# Patient Record
Sex: FEMALE | URBAN - METROPOLITAN AREA
[De-identification: names, ages, dates, MRNs, and addresses within clinical notes are randomized per-mention and may not be internally consistent; named-entity substitution may affect disease eponyms.]

---

## 2023-04-19 ENCOUNTER — TELEPHONE ENCOUNTER (OUTPATIENT)
Dept: URBAN - METROPOLITAN AREA CLINIC 113 | Facility: CLINIC | Age: 68
End: 2023-04-19

## 2023-05-09 ENCOUNTER — TELEPHONE ENCOUNTER (OUTPATIENT)
Dept: URBAN - METROPOLITAN AREA CLINIC 113 | Facility: CLINIC | Age: 68
End: 2023-05-09

## 2023-05-18 ENCOUNTER — OFFICE VISIT (OUTPATIENT)
Dept: URBAN - METROPOLITAN AREA SURGERY CENTER 25 | Facility: SURGERY CENTER | Age: 68
End: 2023-05-18

## 2023-06-01 ENCOUNTER — OFFICE VISIT (OUTPATIENT)
Dept: URBAN - METROPOLITAN AREA SURGERY CENTER 25 | Facility: SURGERY CENTER | Age: 68
End: 2023-06-01

## 2025-03-05 ENCOUNTER — DASHBOARD ENCOUNTERS (OUTPATIENT)
Age: 70
End: 2025-03-05

## 2025-03-05 ENCOUNTER — OFFICE VISIT (OUTPATIENT)
Dept: URBAN - METROPOLITAN AREA CLINIC 113 | Facility: CLINIC | Age: 70
End: 2025-03-05
Payer: MEDICARE

## 2025-03-05 VITALS
HEART RATE: 72 BPM | BODY MASS INDEX: 25.74 KG/M2 | HEIGHT: 64 IN | TEMPERATURE: 97.5 F | DIASTOLIC BLOOD PRESSURE: 69 MMHG | SYSTOLIC BLOOD PRESSURE: 159 MMHG | RESPIRATION RATE: 18 BRPM | WEIGHT: 150.8 LBS

## 2025-03-05 DIAGNOSIS — R19.7 INTERMITTENT DIARRHEA: ICD-10-CM

## 2025-03-05 DIAGNOSIS — D50.0 IRON DEFICIENCY ANEMIA DUE TO CHRONIC BLOOD LOSS: ICD-10-CM

## 2025-03-05 PROBLEM — 724556004: Status: ACTIVE | Noted: 2025-03-05

## 2025-03-05 PROCEDURE — 99203 OFFICE O/P NEW LOW 30 MIN: CPT

## 2025-03-05 PROCEDURE — 99243 OFF/OP CNSLTJ NEW/EST LOW 30: CPT

## 2025-03-05 RX ORDER — HYDROCHLOROTHIAZIDE 25 MG/1
TAKE 1 TABLET BY MOUTH EVERY DAY IN THE MORNING FOR 90 DAYS TABLET ORAL
Qty: 90 EACH | Refills: 2 | Status: ACTIVE | COMMUNITY

## 2025-03-05 RX ORDER — LEVOTHYROXINE SODIUM 88 UG/1
TABLET ORAL
Qty: 90 TABLET | Status: ACTIVE | COMMUNITY

## 2025-03-05 RX ORDER — LOSARTAN POTASSIUM 100 MG/1
TABLET, FILM COATED ORAL
Qty: 90 TABLET | Status: ACTIVE | COMMUNITY

## 2025-03-05 NOTE — HPI-TODAY'S VISIT:
69-year-old female with a history of hyperlipidemia, prediabetes, hypertension, hypothyroidism is referred by Dr. Alonso for iron deficiency anemia.  A copy of today's visit will be forwarded to the referring provider. Lungs 2/6/2025:Hemoglobin 10.9, hematocrit 35.1, MCV 95.1, platelets 338, HbA1c 6.3, serum iron 53, iron saturation 17.1%, ferritin 55.9, normal LFTs, triglycerides 248, cholesterol 330, , GFR 44.68, normal B12, normal folate, TSH 5.028  She has a long history of low iron but only recently diagnosed with anemia. She has not noticed BRBPR or melena. She does take iron and this causes dark stool. She takes oral iron tablets every other day. She has history of hemorrhoidectomy. She has had issues with passing gas and not meaning to. No family history of anemia. She donated blood for 30-40 years off and on. Her last time donating was 5 years. Denies abdominal pain,  nausea or vomiting. There is no family history of colon cancer. She had a colonoscopy about ten years ago. She had a negative Cologuard about a year ago. She has never had an EGD.

## 2025-03-07 ENCOUNTER — CLAIMS CREATED FROM THE CLAIM WINDOW (OUTPATIENT)
Dept: URBAN - METROPOLITAN AREA SURGERY CENTER 25 | Facility: SURGERY CENTER | Age: 70
End: 2025-03-07
Payer: MEDICARE

## 2025-03-07 DIAGNOSIS — D50.0 IRON DEFICIENCY ANEMIA SECONDARY TO BLOOD LOSS (CHRONIC): ICD-10-CM

## 2025-03-07 PROCEDURE — 43235 EGD DIAGNOSTIC BRUSH WASH: CPT | Performed by: INTERNAL MEDICINE

## 2025-03-07 PROCEDURE — 43235 EGD DIAGNOSTIC BRUSH WASH: CPT | Performed by: CLINIC/CENTER

## 2025-03-07 RX ORDER — HYDROCHLOROTHIAZIDE 25 MG/1
TAKE 1 TABLET BY MOUTH EVERY DAY IN THE MORNING FOR 90 DAYS TABLET ORAL
Qty: 90 EACH | Refills: 2 | Status: ACTIVE | COMMUNITY

## 2025-03-07 RX ORDER — LEVOTHYROXINE SODIUM 88 UG/1
TABLET ORAL
Qty: 90 TABLET | Status: ACTIVE | COMMUNITY

## 2025-03-07 RX ORDER — LOSARTAN POTASSIUM 100 MG/1
TABLET, FILM COATED ORAL
Qty: 90 TABLET | Status: ACTIVE | COMMUNITY

## 2025-04-01 ENCOUNTER — OFFICE VISIT (OUTPATIENT)
Dept: URBAN - METROPOLITAN AREA CLINIC 113 | Facility: CLINIC | Age: 70
End: 2025-04-01
Payer: MEDICARE

## 2025-04-01 DIAGNOSIS — R19.7 INTERMITTENT DIARRHEA: ICD-10-CM

## 2025-04-01 DIAGNOSIS — D50.0 IRON DEFICIENCY ANEMIA DUE TO CHRONIC BLOOD LOSS: ICD-10-CM

## 2025-04-01 PROCEDURE — 99213 OFFICE O/P EST LOW 20 MIN: CPT

## 2025-04-01 RX ORDER — LOSARTAN POTASSIUM 100 MG/1
TABLET, FILM COATED ORAL
Qty: 90 TABLET | Status: ACTIVE | COMMUNITY

## 2025-04-01 RX ORDER — LEVOTHYROXINE SODIUM 88 UG/1
TABLET ORAL
Qty: 90 TABLET | Status: ACTIVE | COMMUNITY

## 2025-04-01 RX ORDER — HYDROCHLOROTHIAZIDE 25 MG/1
TAKE 1 TABLET BY MOUTH EVERY DAY IN THE MORNING FOR 90 DAYS TABLET ORAL
Qty: 90 EACH | Refills: 2 | Status: ACTIVE | COMMUNITY

## 2025-04-01 NOTE — HPI-TODAY'S VISIT:
69-year-old female with a history of hyperlipidemia, prediabetes, hypertension, hypothyroidism is referred by Dr. Alonso for iron deficiency anemia.  A copy of today's visit will be forwarded to the referring provider. Lungs 2/6/2025:Hemoglobin 10.9, hematocrit 35.1, MCV 95.1, platelets 338, HbA1c 6.3, serum iron 53, iron saturation 17.1%, ferritin 55.9, normal LFTs, triglycerides 248, cholesterol 330, , GFR 44.68, normal B12, normal folate, TSH 5.028  She has a long history of low iron but only recently diagnosed with anemia. She has not noticed BRBPR or melena. She does take iron and this causes dark stool. She takes oral iron tablets every other day. She has history of hemorrhoidectomy. She has had issues with passing gas and not meaning to. No family history of anemia. She donated blood for 30-40 years off and on. Her last time donating was 5 years. Denies abdominal pain,  nausea or vomiting. There is no family history of colon cancer. She had a colonoscopy about ten years ago. She had a negative Cologuard about a year ago. She has never had an EGD.  Interval history,/20/25: EGD 3/11/2025: Performed without difficulty.  Normal duodenum and esophagus.  2 cm small hiatal hernia.  No specimens collected.  She feels well.  She has no GI complaints.  Denies blood per rectum or melena.  Denies reflux or regurgitation.

## 2025-04-11 ENCOUNTER — TELEPHONE ENCOUNTER (OUTPATIENT)
Dept: URBAN - METROPOLITAN AREA CLINIC 113 | Facility: CLINIC | Age: 70
End: 2025-04-11

## 2025-04-14 ENCOUNTER — TELEPHONE ENCOUNTER (OUTPATIENT)
Dept: URBAN - METROPOLITAN AREA CLINIC 113 | Facility: CLINIC | Age: 70
End: 2025-04-14

## 2025-04-14 RX ORDER — POLYETHYLENE GLYCOL 3350, SODIUM CHLORIDE, SODIUM BICARBONATE, POTASSIUM CHLORIDE 420; 11.2; 5.72; 1.48 G/4L; G/4L; G/4L; G/4L
420 GM POWDER, FOR SOLUTION ORAL ONCE
Qty: 1 | Refills: 0 | OUTPATIENT
Start: 2025-04-14 | End: 2025-04-15

## 2025-04-16 ENCOUNTER — CLAIMS CREATED FROM THE CLAIM WINDOW (OUTPATIENT)
Dept: URBAN - METROPOLITAN AREA SURGERY CENTER 25 | Facility: SURGERY CENTER | Age: 70
End: 2025-04-16
Payer: MEDICARE

## 2025-04-16 ENCOUNTER — CLAIMS CREATED FROM THE CLAIM WINDOW (OUTPATIENT)
Dept: URBAN - METROPOLITAN AREA CLINIC 4 | Facility: CLINIC | Age: 70
End: 2025-04-16
Payer: MEDICARE

## 2025-04-16 DIAGNOSIS — D12.2 ADENOMA OF ASCENDING COLON: ICD-10-CM

## 2025-04-16 DIAGNOSIS — D50.0 IRON DEFICIENCY ANEMIA DUE TO CHRONIC BLOOD LOSS: ICD-10-CM

## 2025-04-16 DIAGNOSIS — D50.0 IRON DEFICIENCY ANEMIA SECONDARY TO BLOOD LOSS (CHRONIC): ICD-10-CM

## 2025-04-16 DIAGNOSIS — K63.89 OTHER SPECIFIED DISEASES OF INTESTINE: ICD-10-CM

## 2025-04-16 DIAGNOSIS — D12.2 BENIGN NEOPLASM OF ASCENDING COLON: ICD-10-CM

## 2025-04-16 PROCEDURE — 00811 ANES LWR INTST NDSC NOS: CPT | Performed by: NURSE ANESTHETIST, CERTIFIED REGISTERED

## 2025-04-16 PROCEDURE — 45385 COLONOSCOPY W/LESION REMOVAL: CPT | Performed by: INTERNAL MEDICINE

## 2025-04-16 PROCEDURE — 88305 TISSUE EXAM BY PATHOLOGIST: CPT | Performed by: PATHOLOGY

## 2025-04-16 PROCEDURE — 45385 COLONOSCOPY W/LESION REMOVAL: CPT | Performed by: CLINIC/CENTER

## 2025-04-16 PROCEDURE — 00811 ANES LWR INTST NDSC NOS: CPT | Performed by: ANESTHESIOLOGY

## 2025-04-16 RX ORDER — LEVOTHYROXINE SODIUM 88 UG/1
TABLET ORAL
Qty: 90 TABLET | Status: ACTIVE | COMMUNITY

## 2025-04-16 RX ORDER — LOSARTAN POTASSIUM 100 MG/1
TABLET, FILM COATED ORAL
Qty: 90 TABLET | Status: ACTIVE | COMMUNITY

## 2025-04-16 RX ORDER — HYDROCHLOROTHIAZIDE 25 MG/1
TAKE 1 TABLET BY MOUTH EVERY DAY IN THE MORNING FOR 90 DAYS TABLET ORAL
Qty: 90 EACH | Refills: 2 | Status: ACTIVE | COMMUNITY

## 2025-05-21 ENCOUNTER — OFFICE VISIT (OUTPATIENT)
Dept: URBAN - METROPOLITAN AREA CLINIC 113 | Facility: CLINIC | Age: 70
End: 2025-05-21
Payer: MEDICARE

## 2025-05-21 DIAGNOSIS — D50.0 IRON DEFICIENCY ANEMIA DUE TO CHRONIC BLOOD LOSS: ICD-10-CM

## 2025-05-21 DIAGNOSIS — D12.2 ADENOMA OF ASCENDING COLON: ICD-10-CM

## 2025-05-21 DIAGNOSIS — K57.90 DIVERTICULOSIS: ICD-10-CM

## 2025-05-21 DIAGNOSIS — R19.7 INTERMITTENT DIARRHEA: ICD-10-CM

## 2025-05-21 DIAGNOSIS — K44.9 HIATAL HERNIA: ICD-10-CM

## 2025-05-21 PROBLEM — 397881000: Status: ACTIVE | Noted: 2025-05-21

## 2025-05-21 PROCEDURE — 99213 OFFICE O/P EST LOW 20 MIN: CPT

## 2025-05-21 RX ORDER — LOSARTAN POTASSIUM 100 MG/1
TABLET, FILM COATED ORAL
Qty: 90 TABLET | Status: ACTIVE | COMMUNITY

## 2025-05-21 RX ORDER — HYDROCHLOROTHIAZIDE 25 MG/1
TAKE 1 TABLET BY MOUTH EVERY DAY IN THE MORNING FOR 90 DAYS TABLET ORAL
Qty: 90 EACH | Refills: 2 | Status: ACTIVE | COMMUNITY

## 2025-05-21 RX ORDER — LEVOTHYROXINE SODIUM 88 UG/1
TABLET ORAL
Qty: 90 TABLET | Status: ACTIVE | COMMUNITY

## 2025-05-21 NOTE — HPI-TODAY'S VISIT:
69-year-old female with a history of hyperlipidemia, prediabetes, hypertension, hypothyroidism is referred by Dr. Alonso for iron deficiency anemia.  A copy of today's visit will be forwarded to the referring provider. Lungs 2/6/2025:Hemoglobin 10.9, hematocrit 35.1, MCV 95.1, platelets 338, HbA1c 6.3, serum iron 53, iron saturation 17.1%, ferritin 55.9, normal LFTs, triglycerides 248, cholesterol 330, , GFR 44.68, normal B12, normal folate, TSH 5.028  She has a long history of low iron but only recently diagnosed with anemia. She has not noticed BRBPR or melena. She does take iron and this causes dark stool. She takes oral iron tablets every other day. She has history of hemorrhoidectomy. She has had issues with passing gas and not meaning to. No family history of anemia. She donated blood for 30-40 years off and on. Her last time donating was 5 years. Denies abdominal pain,  nausea or vomiting. There is no family history of colon cancer. She had a colonoscopy about ten years ago. She had a negative Cologuard about a year ago. She has never had an EGD.  Interval history, 4/20/25: EGD 3/11/2025: Performed without difficulty.  Normal duodenum and esophagus.  2 cm small hiatal hernia.  No specimens collected.  She feels well.  She has no GI complaints.  Denies blood per rectum or melena.  Denies reflux or regurgitation.  Interval history, 5/21/2025: Colonoscopy 4/16/2025: Performed that difficulty.  Quality of bowel prep was good.  Perianal digital rectal exams normal.  TI normal.  A 4 mm polyp was removed from the mid ascending colon.  Diverticulosis in the sigmoid colon.  Pathology on polyp returned as a tubular adenoma.  Surveillance was recommended in 7 years.  She started an oral iron every other day. Her energy has picked up again. She otherwise has no GI complaints.